# Patient Record
Sex: MALE | Race: WHITE
[De-identification: names, ages, dates, MRNs, and addresses within clinical notes are randomized per-mention and may not be internally consistent; named-entity substitution may affect disease eponyms.]

---

## 2018-04-02 ENCOUNTER — HOSPITAL ENCOUNTER (OUTPATIENT)
Dept: HOSPITAL 17 - NEDDLT | Age: 28
Setting detail: OBSERVATION
Discharge: HOME | End: 2018-04-02
Attending: INTERNAL MEDICINE | Admitting: INTERNAL MEDICINE
Payer: SELF-PAY

## 2018-04-02 VITALS
RESPIRATION RATE: 16 BRPM | SYSTOLIC BLOOD PRESSURE: 112 MMHG | TEMPERATURE: 98.6 F | OXYGEN SATURATION: 99 % | DIASTOLIC BLOOD PRESSURE: 74 MMHG | HEART RATE: 62 BPM

## 2018-04-02 DIAGNOSIS — R07.89: Primary | ICD-10-CM

## 2018-04-02 PROCEDURE — 83735 ASSAY OF MAGNESIUM: CPT

## 2018-04-02 PROCEDURE — 82550 ASSAY OF CK (CPK): CPT

## 2018-04-02 PROCEDURE — 85025 COMPLETE CBC W/AUTO DIFF WBC: CPT

## 2018-04-02 PROCEDURE — G0378 HOSPITAL OBSERVATION PER HR: HCPCS

## 2018-04-02 PROCEDURE — 80048 BASIC METABOLIC PNL TOTAL CA: CPT

## 2018-04-02 PROCEDURE — 99285 EMERGENCY DEPT VISIT HI MDM: CPT

## 2018-04-02 PROCEDURE — 82552 ASSAY OF CPK IN BLOOD: CPT

## 2018-04-02 PROCEDURE — 93005 ELECTROCARDIOGRAM TRACING: CPT

## 2018-04-02 PROCEDURE — 84484 ASSAY OF TROPONIN QUANT: CPT

## 2018-04-02 PROCEDURE — 85379 FIBRIN DEGRADATION QUANT: CPT

## 2018-04-02 PROCEDURE — 85730 THROMBOPLASTIN TIME PARTIAL: CPT

## 2018-04-02 PROCEDURE — 85610 PROTHROMBIN TIME: CPT

## 2018-04-02 PROCEDURE — 93017 CV STRESS TEST TRACING ONLY: CPT

## 2018-04-02 PROCEDURE — 71046 X-RAY EXAM CHEST 2 VIEWS: CPT

## 2018-04-02 NOTE — HHI.HP
HPI


Primary Care Physician


No Primary Care Physician


Chief Complaint


Chest pain


History of Present Illness


This is a 27-year-old male that presents to ED in [facility] to be evaluated for 

chest discomfort and was subsequently transported via [facility] to the chest pain 

center.  He states that he developed a left-sided discomfort in his chest about 

830 this morning.  Describes it as a puncturing type of sensation.  Lasted 45-

50 minutes.  Denies shortness of breath, nausea, or diaphoresis.  States that 

he was told in [place] about 5 years ago that he has Brugada syndrome.  He 

was told to follow-up with a cardiologist but has not.  Has never had stress 

test heart catheterization.  Denies recent illness.  Denies fevers or chills.  

Denies chest discomfort at this time.





Review of Systems


General: Patient denies fevers, chills, and recent travel.


HEENT: Patient denies headache, sore throat, difficulty swallowing.  


Cardiovascular: Has the chest discomfort as mentioned above.  Denies sensation 

of heart beating rapidly or irregularly.  No syncope.  


Respiratory: Denies shortness of breath or inspirational chest discomfort.  

Denies coughing wheezing or hemoptysis.  


GI: Patient denies nausea, vomiting, diarrhea, abdominal pain, bloody stools.


Musculoskeletal: Patient denies joint pain or edema.  Denies calf pain or edema.


Neurovascular: Patient denies numbness, tingling, weakness in extremities.  

Denies headache.


Endocrine: Denies polyuria and polydipsia.


Hematologic: Denies easy bruising.


Skin: Denies rash or itching.





Past Family Social History


Allergies:  


Uncoded Allergies:  


     SEAFOOD (Allergy, Severe, Swelling, 18)


Past Medical History


Denies hypertension, hyperlipidemia, diabetes, and CAD.  He was told in [place] that he has Brugada syndrome however Dr. Umanzor evaluated his 2 EKGs and 

there is no pattern of Brugada syndrome.  He has early repolarization.  This 

was discussed with the patient.


Past Surgical History


Denies prior surgeries.


Reported Medications





Reported Meds & Active Scripts


Active


No Active Prescriptions or Reported Medications


Family History


Denies family history of CAD.


Social History


Does not smoke.  Denies alcohol use.  Denies illicit drug use.





Physical Exam


Vital Signs





Vital Signs








  Date Time  Temp Pulse Resp B/P (MAP) Pulse Ox O2 Delivery O2 Flow Rate FiO2


 


18 15:43 98.6 62 16 112/74 (87) 99   








Physical Exam


GENERAL: This is a well-nourished, well-developed patient, in no apparent 

distress.  Patient speaks in clear complete sentences.  Patient is pleasant.


HEENT: Head is atraumatic and normocephalic.  Neck is supple without 

lymphadenopathy and trachea is midline.  No JVD or carotid bruits.


CARDIOVASCULAR: Regular rate and rhythm without murmurs, gallops, or rubs. 


RESPIRATORY: Clear to auscultation. Breath sounds equal bilaterally. No wheezes

, rales, or rhonchi.  Chest wall is nontender.  No use of accessory muscles.


GASTROINTESTINAL: Abdomen is nontender, nondistended.  Abdomen soft.  No 

obvious pulsatile mass or bruit.  No CVA tenderness.  Strong femoral pulses 

bilaterally.  Normal bowel sounds in all quadrants.


MUSCULOSKELETAL: Patient is moving upper and lower extremities freely.  No calf 

tenderness or edema, no Homans sign.  Strong pulses in upper and lower 

extremities.


NEUROLOGICAL: Patient is alert and oriented.  Cranial nerves 2-12 are grossly 

intact.  No focal deficits and speech is clear.


SKIN: No rash and turgor is normal.


Laboratory


CBC and basic metabolic panel are okay.  Troponin 2 are normal.  D-dimer is 

normal less than 0.19.


Imaging


Chest x-ray reveals nothing acute.


Course


First 2 EKGs have pattern of early repolarization.  Sinus rhythm.





Caprini VTE Risk Assessment


Caprini VTE Risk Assessment:  No/Low Risk (score <= 1)


Caprini Risk Assessment Model











 Point Value = 1          Point Value = 2  Point Value = 3  Point Value = 5


 


Age 41-60


Minor surgery


BMI > 25 kg/m2


Swollen legs


Varicose veins


Pregnancy or postpartum


History of unexplained or recurrent


   spontaneous 


Oral contraceptives or hormone


   replacement


Sepsis (< 1 month)


Serious lung disease, including


   pneumonia (< 1 month)


Abnormal pulmonary function


Acute myocardial infarction


Congestive heart failure (< 1 month)


History of inflammatory bowel disease


Medical patient at bed rest Age 61-74


Arthroscopic surgery


Major open surgery (> 45 min)


Laparoscopic surgery (> 45 min)


Malignancy


Confined to bed (> 72 hours)


Immobilizing plaster cast


Central venous access Age >= 75


History of VTE


Family history of VTE


Factor V Leiden


Prothrombin 89885E


Lupus anticoagulant


Anticardiolipin antibodies


Elevated serum homocysteine


Heparin-induced thrombocytopenia


Other congenital or acquired


   thrombophilia Stroke (< 1 month)


Elective arthroplasty


Hip, pelvis, or leg fracture


Acute spinal cord injury (< 1 month)








Prophylaxis Regimen











   Total Risk


Factor Score Risk Level Prophylaxis Regimen


 


0-1      Low Early ambulation


 


2 Moderate Order ONE of the following:


*Sequential Compression Device (SCD)


*Heparin 5000 units SQ BID


 


3-4 Higher Order ONE of the following medications:


*Heparin 5000 units SQ TID


*Enoxaparin/Lovenox 40 mg SQ daily (WT < 150 kg, CrCl > 30 mL/min)


*Enoxaparin/Lovenox 30 mg SQ daily (WT < 150 kg, CrCl > 10-29 mL/min)


*Enoxaparin/Lovenox 30 mg SQ BID (WT < 150 kg, CrCl > 30 mL/min)


AND/OR


*Sequential Compression Device (SCD)


 


5 or more Highest Order ONE of the following medications:


*Heparin 5000 units SQ TID (Preferred with Epidurals)


*Enoxaparin/Lovenox 40 mg SQ daily (WT < 150 kg, CrCl > 30 mL/min)


*Enoxaparin/Lovenox 30 mg SQ daily (WT < 150 kg, CrCl > 10-29 mL/min)


*Enoxaparin/Lovenox 30 mg SQ BID (WT < 150 kg, CrCl > 30 mL/min)


AND


*Sequential Compression Device (SCD)











Assessment and Plan


Assessment and Plan


* Atypical chest pain: Patient has had first 2 sets of cardiac enzymes and EKGs 

for ruling out purposes.  He was seen by Dr. Jeb Umanzor of cardiology in 

the chest pain center.  EKG shows pattern of early repolarization, no Brugada 

syndrome.  Patient at this time will have a Johan protocol ETT and if 

nonischemic will be discharged home with instructions to follow-up with PCP.  

Return to ED for interval issues.


Patient is stable at this time.  He is agreeable to this plan.











Timothy Garcia 2018 15:59

## 2018-04-02 NOTE — HHI.HP
HPI


Primary Care Physician


No Primary Care Physician


Chief Complaint


CHEST PAIN


History of Present Illness


This dictation is in error.  Please disregard.  Proper H&P is on record.





Past Family Social History


Allergies:  


Uncoded Allergies:  


     SEAFOOD (Allergy, Severe, Swelling, 18)


Reported Medications





Reported Meds & Active Scripts


Active


No Active Prescriptions or Reported Medications





Caprini VTE Risk Assessment


Caprini VTE Risk Assessment:  No/Low Risk (score <= 1)


Caprini Risk Assessment Model











 Point Value = 1          Point Value = 2  Point Value = 3  Point Value = 5


 


Age 41-60


Minor surgery


BMI > 25 kg/m2


Swollen legs


Varicose veins


Pregnancy or postpartum


History of unexplained or recurrent


   spontaneous 


Oral contraceptives or hormone


   replacement


Sepsis (< 1 month)


Serious lung disease, including


   pneumonia (< 1 month)


Abnormal pulmonary function


Acute myocardial infarction


Congestive heart failure (< 1 month)


History of inflammatory bowel disease


Medical patient at bed rest Age 61-74


Arthroscopic surgery


Major open surgery (> 45 min)


Laparoscopic surgery (> 45 min)


Malignancy


Confined to bed (> 72 hours)


Immobilizing plaster cast


Central venous access Age >= 75


History of VTE


Family history of VTE


Factor V Leiden


Prothrombin 03202D


Lupus anticoagulant


Anticardiolipin antibodies


Elevated serum homocysteine


Heparin-induced thrombocytopenia


Other congenital or acquired


   thrombophilia Stroke (< 1 month)


Elective arthroplasty


Hip, pelvis, or leg fracture


Acute spinal cord injury (< 1 month)








Prophylaxis Regimen











   Total Risk


Factor Score Risk Level Prophylaxis Regimen


 


0-1      Low Early ambulation


 


2 Moderate Order ONE of the following:


*Sequential Compression Device (SCD)


*Heparin 5000 units SQ BID


 


3-4 Higher Order ONE of the following medications:


*Heparin 5000 units SQ TID


*Enoxaparin/Lovenox 40 mg SQ daily (WT < 150 kg, CrCl > 30 mL/min)


*Enoxaparin/Lovenox 30 mg SQ daily (WT < 150 kg, CrCl > 10-29 mL/min)


*Enoxaparin/Lovenox 30 mg SQ BID (WT < 150 kg, CrCl > 30 mL/min)


AND/OR


*Sequential Compression Device (SCD)


 


5 or more Highest Order ONE of the following medications:


*Heparin 5000 units SQ TID (Preferred with Epidurals)


*Enoxaparin/Lovenox 40 mg SQ daily (WT < 150 kg, CrCl > 30 mL/min)


*Enoxaparin/Lovenox 30 mg SQ daily (WT < 150 kg, CrCl > 10-29 mL/min)


*Enoxaparin/Lovenox 30 mg SQ BID (WT < 150 kg, CrCl > 30 mL/min)


AND


*Sequential Compression Device (SCD)











Assessment and Plan


Assessment and Plan


This dictation is an error.  Proper H&P is on record.











Timothy Garcia 2018 15:50

## 2018-04-02 NOTE — HHI.DCPOC
Discharge Care Plan


Diagnosis:  


(1) Chest pain, atypical


Goals to Promote Your Health


* To prevent worsening of your condition and complications


* To maintain your health at the optimal level


Directions to Meet Your Goals


*** Take your medications as prescribed


*** Follow your dietary instruction


*** Follow activity as directed








*** Keep your appointments as scheduled


*** Take your immunizations and boosters as scheduled


*** If your symptoms worsen call your PCP, if no PCP go to Urgent Care Center 

or Emergency Room***


*** Smoking is Dangerous to Your Health. Avoid second hand smoke***


***Call the 24-hour hour crisis hotline for domestic abuse at 1-976.765.1091***











Timothy Garcia Apr 2, 2018 16:21

## 2018-04-03 NOTE — TR
Date Performed: 04/02/2018       Time Performed: 15:56:05

 

DOCTOR:      Aleida Arellano 

 

DRUG LIST:     

CLINICAL HISTORY:     

REASON FOR TEST:      Chest pain

REASON FOR ENDING:     

OBSERVATION:     

CONCLUSION:      BETO PROTOCOL. NO CP. MILD SOB. TEST STOPPED AFTER EXCEEDING GOAL HR SECONDARY TO L
EG FATIGUE.Maximum ER=050 % Max HR Achieved=95.0% Maximum KT=999/90 Total Exercise Time=12:02

COMMENTS: